# Patient Record
Sex: MALE | URBAN - METROPOLITAN AREA
[De-identification: names, ages, dates, MRNs, and addresses within clinical notes are randomized per-mention and may not be internally consistent; named-entity substitution may affect disease eponyms.]

---

## 2023-01-11 ENCOUNTER — EMERGENCY (EMERGENCY)
Facility: HOSPITAL | Age: 43
LOS: 1 days | Discharge: HOME | End: 2023-01-11
Admitting: EMERGENCY MEDICINE
Payer: SELF-PAY

## 2023-01-11 VITALS
TEMPERATURE: 98 F | DIASTOLIC BLOOD PRESSURE: 74 MMHG | HEART RATE: 91 BPM | RESPIRATION RATE: 18 BRPM | OXYGEN SATURATION: 93 % | WEIGHT: 179.9 LBS | SYSTOLIC BLOOD PRESSURE: 113 MMHG

## 2023-01-11 DIAGNOSIS — F10.99 ALCOHOL USE, UNSPECIFIED WITH UNSPECIFIED ALCOHOL-INDUCED DISORDER: ICD-10-CM

## 2023-01-11 PROCEDURE — 99284 EMERGENCY DEPT VISIT MOD MDM: CPT

## 2023-01-11 NOTE — ED ADULT TRIAGE NOTE - CHIEF COMPLAINT QUOTE
Pt found in [facility] , pt admits to drinking alcohol. states his first name is Jeevan

## 2023-01-11 NOTE — ED PROVIDER NOTE - PHYSICAL EXAMINATION
CONSTITUTIONAL: Well-appearing; well-nourished; in no apparent distress.   	HEAD: Normocephalic; atraumatic.   	EYES:  clear bilaterally  ENT: airway patent  Resp breathing comfortably with no distress  ELIAS x 4. ambulatory with normal gait.   PSYCHOLOGICAL: The patient’s mood and manner are appropriate.

## 2023-01-11 NOTE — ED PROVIDER NOTE - OBJECTIVE STATEMENT
42 yo male BIBEMS for alcohol ingestion. patient wishing to leave. he has no medical complaints. he has steady gait. w

## 2023-01-11 NOTE — ED PROVIDER NOTE - PATIENT PORTAL LINK FT
You can access the FollowMyHealth Patient Portal offered by [facility] by registering at the following website: [email or web address]. By joining [employer]’s FollowMyHealth portal, you will also be able to view your health information using other applications (apps) compatible with our system.

## 2023-01-11 NOTE — ED PROVIDER NOTE - CLINICAL SUMMARY MEDICAL DECISION MAKING FREE TEXT BOX
44 yo male BIBEMS for alcohol ingestion, patient asking to leave, he is clinically sober with normal gait, a&ox4, clear speech. he is refusing to register, patient left.